# Patient Record
Sex: FEMALE | Race: WHITE | NOT HISPANIC OR LATINO
[De-identification: names, ages, dates, MRNs, and addresses within clinical notes are randomized per-mention and may not be internally consistent; named-entity substitution may affect disease eponyms.]

---

## 2018-07-20 ENCOUNTER — TRANSCRIPTION ENCOUNTER (OUTPATIENT)
Age: 58
End: 2018-07-20

## 2018-11-20 ENCOUNTER — OUTPATIENT (OUTPATIENT)
Dept: OUTPATIENT SERVICES | Facility: HOSPITAL | Age: 58
LOS: 1 days | End: 2018-11-20
Payer: MEDICAID

## 2018-11-20 PROCEDURE — 71046 X-RAY EXAM CHEST 2 VIEWS: CPT | Mod: 26

## 2020-05-15 ENCOUNTER — EMERGENCY (EMERGENCY)
Facility: HOSPITAL | Age: 60
LOS: 0 days | Discharge: HOME | End: 2020-05-15
Attending: STUDENT IN AN ORGANIZED HEALTH CARE EDUCATION/TRAINING PROGRAM
Payer: MEDICAID

## 2020-05-15 VITALS
RESPIRATION RATE: 16 BRPM | SYSTOLIC BLOOD PRESSURE: 115 MMHG | TEMPERATURE: 99 F | DIASTOLIC BLOOD PRESSURE: 83 MMHG | HEART RATE: 77 BPM | OXYGEN SATURATION: 100 %

## 2020-05-15 VITALS — HEIGHT: 65 IN | WEIGHT: 123.9 LBS
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
## 2020-05-15 DIAGNOSIS — M25.461 EFFUSION, RIGHT KNEE: ICD-10-CM

## 2020-05-15 DIAGNOSIS — M25.561 PAIN IN RIGHT KNEE: ICD-10-CM

## 2020-05-15 PROBLEM — Z00.00 ENCOUNTER FOR PREVENTIVE HEALTH EXAMINATION: Status: ACTIVE | Noted: 2020-05-15

## 2020-05-15 PROCEDURE — 73562 X-RAY EXAM OF KNEE 3: CPT | Mod: RT

## 2020-05-15 PROCEDURE — 73562 X-RAY EXAM OF KNEE 3: CPT | Mod: 26,RT

## 2020-05-15 PROCEDURE — 99283 EMERGENCY DEPT VISIT LOW MDM: CPT | Mod: 25

## 2020-05-15 PROCEDURE — 99283 EMERGENCY DEPT VISIT LOW MDM: CPT

## 2020-05-15 NOTE — ED PROVIDER NOTE - CLINICAL SUMMARY MEDICAL DECISION MAKING FREE TEXT BOX
60 y/o female c/o right knee swelling. Discussed need for orthopedics evaluation. Will check XR in ED and provide orthopedic referral.

## 2020-05-15 NOTE — ED PROVIDER NOTE - OBJECTIVE STATEMENT
60 y/o female with no significant PMHx presents to ED c/o right knee swelling s/p tripping and falling at the end of February. Pt has not been evaluated and has not had any imaging due to coronavirus pandemic closing outpatient centers. Pt has been walking for past 48 hours, however, walking has become more difficult. Pt denies re-injury since slip and fall 2 months ago. Pt notes limited ROM and denies numbness and tingling. Pt has not had surgery on her knee.

## 2020-05-15 NOTE — ED PROVIDER NOTE - CARE PROVIDER_API CALL
Emelyn De La Torre  [facility]  [address] [place] [address]  Phone: [phone]  Fax: [phone]  Follow Up Time:

## 2020-05-15 NOTE — ED PROVIDER NOTE - MUSCULOSKELETAL, MLM
Right suprapatellar knee effusion. No erythema, No obvious deformity. Neuro intact, 5/5 strength. Ambulatory with limp.

## 2020-05-15 NOTE — ED PROVIDER NOTE - ATTENDING CONTRIBUTION TO CARE
I, Cami Lester DO,  performed the initial face to face bedside interview with this patient regarding history of present illness, review of symptoms and relevant past medical, social and family history.  I completed an independent physical examination.  I was the initial provider who evaluated this patient. I have signed out the follow up of any pending tests (i.e. labs, radiological studies) to the ACP.  I have communicated the patient’s plan of care and disposition with the ACP.  The history, relevant review of systems, past medical and surgical history, medical decision making, and physical examination was documented by the scribe in my presence and I attest to the accuracy of the documentation.

## 2020-05-15 NOTE — ED PROVIDER NOTE - PATIENT PORTAL LINK FT
You can access the FollowMyHealth Patient Portal offered by [facility] by registering at the following website: [email or web address]. By joining [email or web address] [email or web address] portal, you will also be able to view your health information using other applications (apps) compatible with our system.

## 2020-05-20 ENCOUNTER — APPOINTMENT (OUTPATIENT)
Age: 60
End: 2020-05-20
Payer: MEDICAID

## 2020-05-20 VITALS
HEART RATE: 84 BPM | BODY MASS INDEX: 20.49 KG/M2 | WEIGHT: 120 LBS | HEIGHT: 64 IN | DIASTOLIC BLOOD PRESSURE: 74 MMHG | SYSTOLIC BLOOD PRESSURE: 124 MMHG | TEMPERATURE: 98.1 F

## 2020-05-20 PROCEDURE — 99204 OFFICE O/P NEW MOD 45 MIN: CPT | Mod: 25

## 2020-05-20 PROCEDURE — 20610 DRAIN/INJ JOINT/BURSA W/O US: CPT | Mod: RT

## 2020-05-20 NOTE — DISCUSSION/SUMMARY
[de-identified] : Today in the office I had a lengthy conversation with the patient regarding her right knee degenerative osteoarthritis status post her multiple falls as well as the chondrocalcinosis seen on x-ray. I am recommending a conservative spectrum of treatment including cortisone injection today as well as anti-inflammatories rest, ice and possible physical therapy. She will also utilize a home exercise program. Followup with me will be in 2-3 months as needed.All questions were answered and the patient verbalized understanding.  The patient is in agreement with this treatment plan.

## 2020-05-20 NOTE — HISTORY OF PRESENT ILLNESS
[de-identified] : The patient is a 59 year old female for pain to the right knee.  She had a fall on or about the last week of February/early March involving a rug.  She tripped over the rug.  She hd some swelling to the right knee and it improved. She was able to walk and felt a jolt while navigating uneven terrain in her apartment complex.  Last week she noticed increased swelling.  No new trauma.  No fevers/chills/NS.\par Denies redness.  Denies any relation to work injury or no fault.\par \par Modifying factors: ice

## 2020-05-20 NOTE — PHYSICAL EXAM
[de-identified] : Outside xrays from  5/15/20 show no fracture.  Degenerative changes.  Alignment maintained.  [de-identified] : General: Alert and oriented x3. In no acute distress. Pleasant in nature with a normal affect.  No apparent respiratory distress.\par \par Right knee exam\par \par Skin: Clean, dry, intact\par Inspection: No obvious malalignment, no masses, + swelling, + effusion\par Pulses: 2+ DP/PT pulses\par ROM: RIGHT 5-90 degrees of flexion. No pain with deep knee flexion. LEFT 120 degrees of flexion. + pain with deep knee flexion. \par Tenderness: + MJLT. + LJLT. No pain over the patella facets. No pain to the quadriceps mechanism. \par Stability: Stable to varus, valgus, lachman testing. Negative anterior/posterior drawer.\par Strength: 5/5 Q/H/TA/GS/EHL, no atrophy\par Neuro: In tact to light touch throughout, DTR's normal\par Additional tests: Negative McMurrays test, Negative patellar grind test. \par Mild effusion\par \par

## 2020-05-20 NOTE — PROCEDURE
[de-identified] : Laterality: Right Knee\par \par The risks and benefits of the injection were reviewed with the patient today in office and all their questions were answered.  The injection site was the lateral aspect of the knee.  Prior to giving the injection the injection site was prepped with Chloraprep and a sterile field was created.  Sterile technique was carried out throughout the procedure.  After verbal consent from the patient we went ahead and aspirated 15 ml of joint fluid from the knee and then injected the left knee today with 2 ml 40 mg Depo-Medrol, 4 ml 1% lidocaine and 4 ml of .5% Bupivacaine for a total of 10 ml with a 22 pauly 1.5" needle.  The medication was placed into the suprapatellar pouch without complication.  Post injection the patient reported no pain, had a normal gait and good motion of the knee.  The patient denied numbness and tingling going down their leg.  There were no complications during the procedure.

## 2020-06-20 ENCOUNTER — EMERGENCY (EMERGENCY)
Facility: HOSPITAL | Age: 60
LOS: 0 days | Discharge: HOME | End: 2020-06-20
Attending: EMERGENCY MEDICINE
Payer: MEDICAID

## 2020-06-20 VITALS
SYSTOLIC BLOOD PRESSURE: 131 MMHG | OXYGEN SATURATION: 100 % | DIASTOLIC BLOOD PRESSURE: 78 MMHG | RESPIRATION RATE: 19 BRPM | TEMPERATURE: 99 F | HEART RATE: 82 BPM

## 2020-06-20 VITALS — WEIGHT: 123.9 LBS

## 2020-06-20 DIAGNOSIS — R10.9 UNSPECIFIED ABDOMINAL PAIN: ICD-10-CM

## 2020-06-20 DIAGNOSIS — R05 COUGH: ICD-10-CM

## 2020-06-20 DIAGNOSIS — R11.10 VOMITING, UNSPECIFIED: ICD-10-CM

## 2020-06-20 DIAGNOSIS — K52.9 NONINFECTIVE GASTROENTERITIS AND COLITIS, UNSPECIFIED: ICD-10-CM

## 2020-06-20 DIAGNOSIS — R19.7 DIARRHEA, UNSPECIFIED: ICD-10-CM

## 2020-06-20 DIAGNOSIS — J30.2 OTHER SEASONAL ALLERGIC RHINITIS: ICD-10-CM

## 2020-06-20 LAB
ALBUMIN SERPL ELPH-MCNC: 4.2 G/DL — SIGNIFICANT CHANGE UP (ref 3.3–5)
ALP SERPL-CCNC: 63 U/L — SIGNIFICANT CHANGE UP (ref 40–120)
ALT FLD-CCNC: 26 U/L — SIGNIFICANT CHANGE UP (ref 12–78)
ANION GAP SERPL CALC-SCNC: 6 MMOL/L — SIGNIFICANT CHANGE UP (ref 5–17)
APPEARANCE UR: CLEAR — SIGNIFICANT CHANGE UP
AST SERPL-CCNC: 23 U/L — SIGNIFICANT CHANGE UP (ref 15–37)
BACTERIA # UR AUTO: ABNORMAL
BASOPHILS # BLD AUTO: 0.03 K/UL — SIGNIFICANT CHANGE UP (ref 0–0.2)
BASOPHILS NFR BLD AUTO: 0.3 % — SIGNIFICANT CHANGE UP (ref 0–2)
BILIRUB SERPL-MCNC: 0.5 MG/DL — SIGNIFICANT CHANGE UP (ref 0.2–1.2)
BILIRUB UR-MCNC: NEGATIVE — SIGNIFICANT CHANGE UP
BUN SERPL-MCNC: 6 MG/DL — LOW (ref 7–23)
CALCIUM SERPL-MCNC: 9.7 MG/DL — SIGNIFICANT CHANGE UP (ref 8.5–10.1)
CHLORIDE SERPL-SCNC: 100 MMOL/L — SIGNIFICANT CHANGE UP (ref 96–108)
CO2 SERPL-SCNC: 29 MMOL/L — SIGNIFICANT CHANGE UP (ref 22–31)
COLOR SPEC: YELLOW — SIGNIFICANT CHANGE UP
CREAT SERPL-MCNC: 0.64 MG/DL — SIGNIFICANT CHANGE UP (ref 0.5–1.3)
DIFF PNL FLD: NEGATIVE — SIGNIFICANT CHANGE UP
EOSINOPHIL # BLD AUTO: 0.02 K/UL — SIGNIFICANT CHANGE UP (ref 0–0.5)
EOSINOPHIL NFR BLD AUTO: 0.2 % — SIGNIFICANT CHANGE UP (ref 0–6)
EPI CELLS # UR: SIGNIFICANT CHANGE UP
GLUCOSE SERPL-MCNC: 104 MG/DL — HIGH (ref 70–99)
GLUCOSE UR QL: NEGATIVE MG/DL — SIGNIFICANT CHANGE UP
HCT VFR BLD CALC: 40.7 % — SIGNIFICANT CHANGE UP (ref 34.5–45)
HGB BLD-MCNC: 14 G/DL — SIGNIFICANT CHANGE UP (ref 11.5–15.5)
IMM GRANULOCYTES NFR BLD AUTO: 0.3 % — SIGNIFICANT CHANGE UP (ref 0–1.5)
KETONES UR-MCNC: NEGATIVE — SIGNIFICANT CHANGE UP
LEUKOCYTE ESTERASE UR-ACNC: ABNORMAL
LIDOCAIN IGE QN: 104 U/L — SIGNIFICANT CHANGE UP (ref 73–393)
LYMPHOCYTES # BLD AUTO: 1.64 K/UL — SIGNIFICANT CHANGE UP (ref 1–3.3)
LYMPHOCYTES # BLD AUTO: 14.2 % — SIGNIFICANT CHANGE UP (ref 13–44)
MCHC RBC-ENTMCNC: 32.8 PG — SIGNIFICANT CHANGE UP (ref 27–34)
MCHC RBC-ENTMCNC: 34.4 GM/DL — SIGNIFICANT CHANGE UP (ref 32–36)
MCV RBC AUTO: 95.3 FL — SIGNIFICANT CHANGE UP (ref 80–100)
MONOCYTES # BLD AUTO: 0.7 K/UL — SIGNIFICANT CHANGE UP (ref 0–0.9)
MONOCYTES NFR BLD AUTO: 6.1 % — SIGNIFICANT CHANGE UP (ref 2–14)
NEUTROPHILS # BLD AUTO: 9.11 K/UL — HIGH (ref 1.8–7.4)
NEUTROPHILS NFR BLD AUTO: 78.9 % — HIGH (ref 43–77)
NITRITE UR-MCNC: NEGATIVE — SIGNIFICANT CHANGE UP
PH UR: 8 — SIGNIFICANT CHANGE UP (ref 5–8)
PLATELET # BLD AUTO: 313 K/UL — SIGNIFICANT CHANGE UP (ref 150–400)
POTASSIUM SERPL-MCNC: 3.8 MMOL/L — SIGNIFICANT CHANGE UP (ref 3.5–5.3)
POTASSIUM SERPL-SCNC: 3.8 MMOL/L — SIGNIFICANT CHANGE UP (ref 3.5–5.3)
PROT SERPL-MCNC: 8.5 GM/DL — HIGH (ref 6–8.3)
PROT UR-MCNC: NEGATIVE MG/DL — SIGNIFICANT CHANGE UP
RBC # BLD: 4.27 M/UL — SIGNIFICANT CHANGE UP (ref 3.8–5.2)
RBC # FLD: 12.8 % — SIGNIFICANT CHANGE UP (ref 10.3–14.5)
RBC CASTS # UR COMP ASSIST: SIGNIFICANT CHANGE UP /HPF (ref 0–4)
SODIUM SERPL-SCNC: 135 MMOL/L — SIGNIFICANT CHANGE UP (ref 135–145)
SP GR SPEC: 1.01 — SIGNIFICANT CHANGE UP (ref 1.01–1.02)
UROBILINOGEN FLD QL: NEGATIVE MG/DL — SIGNIFICANT CHANGE UP
WBC # BLD: 11.53 K/UL — HIGH (ref 3.8–10.5)
WBC # FLD AUTO: 11.53 K/UL — HIGH (ref 3.8–10.5)
WBC UR QL: SIGNIFICANT CHANGE UP

## 2020-06-20 PROCEDURE — 81001 URINALYSIS AUTO W/SCOPE: CPT

## 2020-06-20 PROCEDURE — 85025 COMPLETE CBC W/AUTO DIFF WBC: CPT

## 2020-06-20 PROCEDURE — 83690 ASSAY OF LIPASE: CPT

## 2020-06-20 PROCEDURE — 93005 ELECTROCARDIOGRAM TRACING: CPT

## 2020-06-20 PROCEDURE — 36415 COLL VENOUS BLD VENIPUNCTURE: CPT

## 2020-06-20 PROCEDURE — 99284 EMERGENCY DEPT VISIT MOD MDM: CPT | Mod: 25

## 2020-06-20 PROCEDURE — 96374 THER/PROPH/DIAG INJ IV PUSH: CPT

## 2020-06-20 PROCEDURE — 99284 EMERGENCY DEPT VISIT MOD MDM: CPT

## 2020-06-20 PROCEDURE — 80053 COMPREHEN METABOLIC PANEL: CPT

## 2020-06-20 PROCEDURE — 96361 HYDRATE IV INFUSION ADD-ON: CPT

## 2020-06-20 PROCEDURE — 93010 ELECTROCARDIOGRAM REPORT: CPT

## 2020-06-20 RX ORDER — ONDANSETRON 8 MG/1
4 TABLET, FILM COATED ORAL ONCE
Refills: 0 | Status: COMPLETED | OUTPATIENT
Start: 2020-06-20 | End: 2020-06-20

## 2020-06-20 RX ORDER — SODIUM CHLORIDE 9 MG/ML
1000 INJECTION INTRAMUSCULAR; INTRAVENOUS; SUBCUTANEOUS ONCE
Refills: 0 | Status: COMPLETED | OUTPATIENT
Start: 2020-06-20 | End: 2020-06-20

## 2020-06-20 RX ADMIN — SODIUM CHLORIDE 1000 MILLILITER(S): 9 INJECTION INTRAMUSCULAR; INTRAVENOUS; SUBCUTANEOUS at 11:25

## 2020-06-20 RX ADMIN — ONDANSETRON 4 MILLIGRAM(S): 8 TABLET, FILM COATED ORAL at 10:36

## 2020-06-20 RX ADMIN — SODIUM CHLORIDE 1000 MILLILITER(S): 9 INJECTION INTRAMUSCULAR; INTRAVENOUS; SUBCUTANEOUS at 10:36

## 2020-06-20 NOTE — ED STATDOCS - PATIENT PORTAL LINK FT
You can access the FollowMyHealth Patient Portal offered by [facility] by registering at the following website: [email or web address]. By joining [employer]’s FollowMyHealth portal, you will also be able to view your health information using other applications (apps) compatible with our system.

## 2020-06-20 NOTE — ED STATDOCS - PROGRESS NOTE DETAILS
PA note: Patient is a 58 y/o female with PMHx of seasonal allergies with chronic cough who presents to [facility] c/o dull L sided abdominal pain after eating chinese food last night. This morning had diarrhea and minimal vomiting. States abd pain improved but still some discomfort. Denies fever, CP, shortness of breath. Had a recent negative COVID test. ~Raman Ndiaye PA-C   Patient seen and evaluated at bedside. PE essentially normal. Will get labs, PO challenge. Reassess. ~Raman Ndiaye PA-C PA note: All labwork reviewed in details with patient. Patient re-examined and re-evaluated. Patient feels much better at this time. ED evaluation, Diagnosis and management discussed with the patient in detail. Workup results discussed with ED attending, OK to IN home. Close PMD follow up encouraged.  Strict ED return instructions discussed in detail and patient given the opportunity to ask any questions about their discharge diagnosis and instructions. Patient verbalized understanding. ~ Raman Ndiaye PA-C

## 2020-06-20 NOTE — ED STATDOCS - OBJECTIVE STATEMENT
60 y/o female with hx of seasonal allergies with chronic cough, presents to the ED c/o dull L sided abdominal pain after eating chinese food last night. This morning had diarrhea and minimal vomiting. States abd pain improved but still some discomfort. Denies fever, CP, shortness of breath. Had a recent negative COVID test.

## 2020-06-20 NOTE — ED STATDOCS - PHYSICAL EXAMINATION
PA NOTE: GEN: AOX3, NAD. HEENT: Throat clear. Airway is patent. EYES: PERRLA. EOMI. Head: NC/AT. NECK: Supple, No JVD. FROM. C-spine non-tender. CV:S1S2, RRR, LUNGS: Non-labored breathing, no tachypnea. O2sat 100% RA. CTA b/l. No w/r/r. CHEST: Equal chest expansion and rise. No deformity. ABD: Soft, NT/ND, no rebound, no guarding. No CVAT. EXT: No e/c/c. 2+ distal pulses. SKIN: No rashes. NEURO: No focal deficits. CN II-XII intact. FROM. 5/5 motor and sensory. ~Raman Ndiaye PA-C

## 2020-06-20 NOTE — ED STATDOCS - CLINICAL SUMMARY MEDICAL DECISION MAKING FREE TEXT BOX
pt without tenderness currently not suspicion for acute abd, will check basic labs, provide fluids, and reassess. If pt without pain, will likely DC with outpatient f/u. pt without tenderness currently not suspicion for acute abd, will check basic labs, provide fluids, and reassess. If pt without pain, will likely DC with outpatient f/u.    PA note: All labwork reviewed in details with patient. Patient re-examined and re-evaluated. Patient feels much better at this time. ED evaluation, Diagnosis and management discussed with the patient in detail. Workup results discussed with ED attending, OK to dc home. Close PMD follow up encouraged.  Strict ED return instructions discussed in detail and patient given the opportunity to ask any questions about their discharge diagnosis and instructions. Patient verbalized understanding. ~ Raman Ndiaye PA-C pt without tenderness currently not suspicious for acute abd, will check basic labs, provide fluids, and reassess. If pt without pain, will likely DC with outpatient f/u.    PA note: All labwork reviewed in details with patient. Patient re-examined and re-evaluated. Patient feels much better at this time. ED evaluation, Diagnosis and management discussed with the patient in detail. Workup results discussed with ED attending, OK to dc home. Close PMD follow up encouraged.  Strict ED return instructions discussed in detail and patient given the opportunity to ask any questions about their discharge diagnosis and instructions. Patient verbalized understanding. ~ Raman Ndiaye PA-C

## 2020-06-20 NOTE — ED ADULT NURSE NOTE - OBJECTIVE STATEMENT
Pt c/o luq pain started last night after eating chinese  food. pt c/o diarrhea no vomiting. h/o appendectomy.

## 2020-07-22 ENCOUNTER — APPOINTMENT (OUTPATIENT)
Dept: ORTHOPEDIC SURGERY | Facility: CLINIC | Age: 60
End: 2020-07-22
Payer: MEDICAID

## 2020-07-22 DIAGNOSIS — M11.261 OTHER CHONDROCALCINOSIS, RIGHT KNEE: ICD-10-CM

## 2020-07-22 DIAGNOSIS — M17.11 UNILATERAL PRIMARY OSTEOARTHRITIS, RIGHT KNEE: ICD-10-CM

## 2020-07-22 DIAGNOSIS — Z80.8 FAMILY HISTORY OF MALIGNANT NEOPLASM OF OTHER ORGANS OR SYSTEMS: ICD-10-CM

## 2020-07-22 PROCEDURE — 99213 OFFICE O/P EST LOW 20 MIN: CPT

## 2020-07-22 NOTE — DISCUSSION/SUMMARY
[de-identified] : Today I had a lengthy discussion with the patient regarding their right knee pain. I have addressed all the patient's concerns surrounding the pathology of their condition. At this point I advised the patient that she is able to return to work. I would like to see the patient back in the office PRN to reassess their condition. The patient understood and verbally agreed to the treatment plan. All of their questions were answered and they were satisfied with the visit. The patient should call the office if they have any questions or experience worsening symptoms.

## 2020-07-22 NOTE — PHYSICAL EXAM
[de-identified] : General: Alert and oriented x3. In no acute distress. Pleasant in nature with a normal affect.  No apparent respiratory distress.\par \par Right knee exam\par \par Skin: Clean, dry, intact\par Inspection: No obvious malalignment, no masses, + swelling, + effusion\par Pulses: 2+ DP/PT pulses\par ROM: RIGHT 5-90 degrees of flexion. No pain with deep knee flexion. LEFT 120 degrees of flexion. + pain with deep knee flexion. \par Tenderness: + MJLT. + LJLT. No pain over the patella facets. No pain to the quadriceps mechanism. \par Stability: Stable to varus, valgus, lachman testing. Negative anterior/posterior drawer.\par Strength: 5/5 Q/H/TA/GS/EHL, no atrophy\par Neuro: In tact to light touch throughout, DTR's normal\par Additional tests: Negative McMurrays test, Negative patellar grind test. \par Mild effusion [de-identified] : None new obtained.

## 2020-07-22 NOTE — ADDENDUM
[FreeTextEntry1] : I, Jordin Pop, acted solely as a scribe for Dr. Maico Bliss on this date 07/22/2020 .\par All medical record entries made by the Scribe were at my, Dr. Maico Bliss, direction and personally dictated by me on 07/22/2020 . I have reviewed the chart and agree that the record accurately reflects my personal performance of the history, physical exam, assessment and plan. I have also personally directed, reviewed, and agreed with the chart.

## 2020-07-22 NOTE — HISTORY OF PRESENT ILLNESS
[de-identified] : 60 year old female presenting with right knee pain. The patient’s pain is noted to be a 1/10. The pain and swelling are noted to be 100% improved compared to the previous visit. She reports having occasional pain in the front of the knee. She is currently taking no pain medication. No other complaints at this time.

## 2022-04-15 NOTE — ED STATDOCS - ATTENDING CONTRIBUTION TO CARE
normal...
I provided initial assessment of patient and had discussion of plan with ACP and was available for further consultation throughout ed stay.

## 2023-04-04 ENCOUNTER — TRANSCRIPTION ENCOUNTER (OUTPATIENT)
Age: 63
End: 2023-04-04

## 2023-04-04 NOTE — ASU PATIENT PROFILE, ADULT - NSICDXPASTMEDICALHX_GEN_ALL_CORE_FT
PAST MEDICAL HISTORY:  No pertinent past medical history      PAST MEDICAL HISTORY:  Chronic cough dry cough     PAST MEDICAL HISTORY:  Chronic cough dry cough due to allergies

## 2023-04-04 NOTE — ASU PATIENT PROFILE, ADULT - NSICDXPASTSURGICALHX_GEN_ALL_CORE_FT
PAST SURGICAL HISTORY:  H/O breast augmentation     S/P appendectomy     S/P hernia repair right    S/P left knee surgery

## 2023-04-04 NOTE — ASU PATIENT PROFILE, ADULT - NS PREOP UNDERSTANDS INFO
yes No food from midnight, little water till 04:30 AM DOS. Bring photo id, insurance card. Wear loose comfort clothes . No alcohol/ drugs / smoking. Must have an escort . No jewelry, no valuables./yes Time Change message left.  No food from midnight, little water till 0700 AM DOS. Bring photo id, insurance card. Wear loose comfort clothes . No alcohol/ drugs / smoking. Must have an escort . No jewelry, no valuables./yes

## 2023-04-05 ENCOUNTER — TRANSCRIPTION ENCOUNTER (OUTPATIENT)
Age: 63
End: 2023-04-05

## 2023-04-05 ENCOUNTER — OUTPATIENT (OUTPATIENT)
Dept: OUTPATIENT SERVICES | Facility: HOSPITAL | Age: 63
LOS: 1 days | Discharge: HOME | End: 2023-04-05
Payer: MEDICAID

## 2023-04-05 VITALS
DIASTOLIC BLOOD PRESSURE: 73 MMHG | WEIGHT: 120.37 LBS | OXYGEN SATURATION: 98 % | TEMPERATURE: 99 F | HEIGHT: 64 IN | RESPIRATION RATE: 16 BRPM | SYSTOLIC BLOOD PRESSURE: 124 MMHG | HEART RATE: 85 BPM

## 2023-04-05 VITALS
HEART RATE: 70 BPM | DIASTOLIC BLOOD PRESSURE: 60 MMHG | SYSTOLIC BLOOD PRESSURE: 118 MMHG | RESPIRATION RATE: 18 BRPM | OXYGEN SATURATION: 98 %

## 2023-04-05 DIAGNOSIS — Z90.49 ACQUIRED ABSENCE OF OTHER SPECIFIED PARTS OF DIGESTIVE TRACT: Chronic | ICD-10-CM

## 2023-04-05 DIAGNOSIS — Z98.890 OTHER SPECIFIED POSTPROCEDURAL STATES: Chronic | ICD-10-CM

## 2023-04-05 DIAGNOSIS — Z98.82 BREAST IMPLANT STATUS: Chronic | ICD-10-CM

## 2023-04-05 PROCEDURE — 88302 TISSUE EXAM BY PATHOLOGIST: CPT | Mod: 26

## 2023-04-05 PROCEDURE — 88305 TISSUE EXAM BY PATHOLOGIST: CPT | Mod: 26

## 2023-04-05 PROCEDURE — 88304 TISSUE EXAM BY PATHOLOGIST: CPT | Mod: 26

## 2023-04-05 PROCEDURE — 88300 SURGICAL PATH GROSS: CPT | Mod: 26,59

## 2023-04-05 PROCEDURE — 88305 TISSUE EXAM BY PATHOLOGIST: CPT | Mod: 26,59

## 2023-04-05 PROCEDURE — 88173 CYTOPATH EVAL FNA REPORT: CPT | Mod: 26

## 2023-04-05 DEVICE — SURGICEL POWDER 3 GRAMS: Type: IMPLANTABLE DEVICE | Site: BILATERAL | Status: FUNCTIONAL

## 2023-04-05 RX ORDER — CEPHALEXIN 500 MG
1 CAPSULE ORAL
Qty: 20 | Refills: 0
Start: 2023-04-05 | End: 2023-04-09

## 2023-04-05 RX ORDER — OXYCODONE AND ACETAMINOPHEN 5; 325 MG/1; MG/1
1 TABLET ORAL
Qty: 20 | Refills: 0
Start: 2023-04-05

## 2023-04-05 RX ORDER — FENTANYL CITRATE 50 UG/ML
25 INJECTION INTRAVENOUS
Refills: 0 | Status: DISCONTINUED | OUTPATIENT
Start: 2023-04-05 | End: 2023-04-05

## 2023-04-05 RX ORDER — ACETAMINOPHEN 500 MG
1000 TABLET ORAL ONCE
Refills: 0 | Status: COMPLETED | OUTPATIENT
Start: 2023-04-05 | End: 2023-04-05

## 2023-04-05 RX ORDER — APREPITANT 80 MG/1
40 CAPSULE ORAL ONCE
Refills: 0 | Status: COMPLETED | OUTPATIENT
Start: 2023-04-05 | End: 2023-04-05

## 2023-04-05 RX ORDER — SODIUM CHLORIDE 9 MG/ML
1000 INJECTION, SOLUTION INTRAVENOUS
Refills: 0 | Status: DISCONTINUED | OUTPATIENT
Start: 2023-04-05 | End: 2023-04-05

## 2023-04-05 RX ADMIN — APREPITANT 40 MILLIGRAM(S): 80 CAPSULE ORAL at 09:56

## 2023-04-05 RX ADMIN — FENTANYL CITRATE 25 MICROGRAM(S): 50 INJECTION INTRAVENOUS at 14:43

## 2023-04-05 RX ADMIN — FENTANYL CITRATE 25 MICROGRAM(S): 50 INJECTION INTRAVENOUS at 14:20

## 2023-04-05 RX ADMIN — FENTANYL CITRATE 25 MICROGRAM(S): 50 INJECTION INTRAVENOUS at 15:30

## 2023-04-05 RX ADMIN — FENTANYL CITRATE 25 MICROGRAM(S): 50 INJECTION INTRAVENOUS at 14:42

## 2023-04-05 RX ADMIN — Medication 1000 MILLIGRAM(S): at 09:56

## 2023-04-05 RX ADMIN — FENTANYL CITRATE 25 MICROGRAM(S): 50 INJECTION INTRAVENOUS at 14:38

## 2023-04-05 RX ADMIN — FENTANYL CITRATE 25 MICROGRAM(S): 50 INJECTION INTRAVENOUS at 14:08

## 2023-04-05 NOTE — ASU PREOP CHECKLIST - 1.
as per patient no signs and symptoms from covid and no positive test from covid within the past 10 days

## 2023-04-05 NOTE — ASU PREOP CHECKLIST - 2.
Patient has asymmetrical  breasts, right breast bigger than left, non tender to touch, no redness, no discharges  Per patient started graduallly getting bigger since her fall of 2021.

## 2023-04-05 NOTE — ASU PREOP CHECKLIST - ALLERGY BAND ON
SPOKE TO GONZÁLEZ  TO MAKE SURE PT  IS CLEARED AND STATED DR Andre Stark DOES NOT LIKE  HER  WORDING AND WILL NEED TO CORRECT AND WILL LET ME KNOW WHEN THIS  IS READY done

## 2023-04-05 NOTE — ASU DISCHARGE PLAN (ADULT/PEDIATRIC) - CARE PROVIDER_API CALL
Adonis Blancas)  Plastic Surgery  [address] [place] [id number]  Phone: [phone]  Fax: [phone]  Follow Up Time:

## 2023-04-05 NOTE — ASU DISCHARGE PLAN (ADULT/PEDIATRIC) - ASU DC SPECIAL INSTRUCTIONSFT
Please follow-up with Dr. Blancas in next week. Call the office to schedule an appointment.  Please take Tylenol as needed for pain every 6 hours. You may take Percocet for severe breakthrough pain (4 tablets max a day)  Keep bra on until you follow-up in the office. Sponge bath only.  Take keflex 4 tabs a day for 5 days.    KIMBERLY Drain Care:  *Please look at the site every day for signs of infection (increased redness or pain, swelling, odor, yellow or bloody discharge, warm to touch, fever).  *Maintain suction of the bulb.  *Note color, consistency, and amount of fluid in the drain. Call the doctor, nurse practitioner, or VNA nurse if the amount increases significantly or changes in character.  *Be sure to empty the drain frequently. Record the output, if instructed to do so.  *Keep the insertion site clean and dry otherwise.  *Avoid swimming, baths, hot tubs; do not submerge yourself in water.  *Make sure to keep the drain attached securely to your body to prevent pulling or dislocation.

## 2023-04-05 NOTE — ASU DISCHARGE PLAN (ADULT/PEDIATRIC) - NS MD DC FALL RISK RISK
For information on Fall & Injury Prevention, visit: [email or web address] OR  [email or web address] OR  https://www.cdc.gov/steadi/patient.html

## 2023-04-05 NOTE — ASU PREOP CHECKLIST - LOOSE TEETH
tissue    TOTAL KNEE ARTHROPLASTY Right 9/2015       Family History: Denies family history of colorectal cancer    Social History:   Social History   Substance Use Topics    Smoking status: Former Smoker     Packs/day: 0.50     Years: 15.00     Types: Cigarettes    Smokeless tobacco: Never Used    Alcohol use Yes      Comment: rare      Tobacco cessation counseling provided as appropriate. REVIEW OF SYSTEMS:    Pertinent positives and negatives are mentioned in the HPI above. Otherwise, all other systems were reviewed and negative. Objective:     Physical Exam   /86 (Site: Left Arm, Position: Sitting, Cuff Size: Large Adult)   Ht 5' 9\" (1.753 m)   Wt 270 lb 12.8 oz (122.8 kg)   BMI 39.99 kg/m²   Constitutional: Appears well-developed and well-nourished. Grooming appropriate. No gross deformities. Body mass index is 39.99 kg/m². Eyes: No scleral icterus. Conjunctiva/lids normal. Vision intact grossly. Pupils equal/symmetric, reactive bilaterally. ENT: External ears/nose without defect, scars, or masses. Hearing grossly intact. No facial deformity. Lips normal, normal dentition. Neck: No masses. Trachea midline. No crepitus. Thyroid not enlarged. Cardiovascular: Normal rate. No peripheral edema. Abdominal aorta normal size to palpation. Pulmonary/Chest: Effort normal. No respiratory distress. No wheezes. No use of accessory muscles. Musculoskeletal: Normal range of motion x all 4 extremities and head/neck, without deformity, pain, or crepitus, with normal strength and tone. Normal gait. Nails without clubbing or cyanosis. Neurological: Alert and oriented to person, place, and time. No gross deficits. Sensation intact. Skin: Skin is dry. No rashes noted. No pallor. No induration of nodules. Psychiatric: Normal mood and affect. Behavior normal. Oriented to person, place, and time. Judgment and insight reasonable.     Abdominal/wound: soft, obese, previously healed scars, nontender, satisfaction. Patient understands higher risk of perioperative morbidity and mortality given: morbid obesity, previous surgery, anti TNF alpha inhibitor medication    DISPOSITION:  MRe/CTe followed by surgery    My findings will be relayed to consulting practitioner or PCP via Epic. Also discussed case with her gastroenterologist via telephone. Total encounter time:  60 min with > 50% spent with face-to-face counseling and coordination of care, including: thorough discussion regarding pathophysiology and natural history of Crohn's small bowel disease, discussion of medical and surgical options, including risks of proposed surgery, perioperative expectations. Note completed using dictation software, please excuse any errors.     Electronically signed by Keira Poe MD on 10/11/2017 at 3:46 PM no

## 2023-04-10 LAB — NON-GYNECOLOGICAL CYTOLOGY STUDY: SIGNIFICANT CHANGE UP

## 2023-04-13 LAB — SURGICAL PATHOLOGY STUDY: SIGNIFICANT CHANGE UP

## 2023-04-19 NOTE — ED PROVIDER NOTE - CPE EDP RESP NORM
Pharmacy Vancomycin Initial Note  Date of Service 2023  Patient's  1955  68 year old, male    Indication: Sepsis and Urinary Tract Infection    Current estimated CrCl = Estimated Creatinine Clearance: 130.8 mL/min (A) (based on SCr of 0.62 mg/dL (L)).    Creatinine for last 3 days  2023:  2:40 AM Creatinine 0.62 mg/dL    Recent Vancomycin Level(s) for last 3 days  No results found for requested labs within last 3 days.      Vancomycin IV Administrations (past 72 hours)      No vancomycin orders with administrations in past 72 hours.                Nephrotoxins and other renal medications (From now, onward)    Start     Dose/Rate Route Frequency Ordered Stop    23  vancomycin (VANCOCIN) 1,500 mg in sodium chloride 0.9 % 500 mL intermittent infusion         1,500 mg  over 90 Minutes Intravenous EVERY 12 HOURS 23            Contrast Orders - past 72 hours (72h ago, onward)    None          [employer] Prediction of Planned Initial Vancomycin Regimen  Loading dose: N/A  Regimen: 1500 mg IV every 12 hours.  Start time: 04:23 on 2023  Exposure target: AUC24 (range)400-600 mg/L.hr   AUC24,ss: 547 mg/L.hr  Probability of AUC24 > 400: 81 %  Ctrough,ss: 16.7 mg/L  Probability of Ctrough,ss > 20: 36 %  Probability of nephrotoxicity (Lodise HERON ): 12 %          Plan:  1. Start vancomycin  1500 mg IV q12h.   2. Vancomycin monitoring method: AUC  3. Vancomycin therapeutic monitoring goal: 400-600 mg*h/L  4. Pharmacy will check vancomycin levels as appropriate in 1-3 Days.    5. Serum creatinine levels will be ordered daily for the first week of therapy and at least twice weekly for subsequent weeks.      Ezequiel Barnes, PharmD     normal...

## 2025-05-15 ENCOUNTER — EMERGENCY (EMERGENCY)
Facility: HOSPITAL | Age: 65
LOS: 0 days | Discharge: OTHER INSTITUTION | End: 2025-05-15
Attending: STUDENT IN AN ORGANIZED HEALTH CARE EDUCATION/TRAINING PROGRAM
Payer: MEDICARE

## 2025-05-15 ENCOUNTER — EMERGENCY (EMERGENCY)
Facility: HOSPITAL | Age: 65
LOS: 1 days | End: 2025-05-15
Attending: EMERGENCY MEDICINE | Admitting: EMERGENCY MEDICINE
Payer: MEDICARE

## 2025-05-15 VITALS
SYSTOLIC BLOOD PRESSURE: 141 MMHG | TEMPERATURE: 98 F | HEART RATE: 85 BPM | OXYGEN SATURATION: 96 % | DIASTOLIC BLOOD PRESSURE: 72 MMHG | RESPIRATION RATE: 16 BRPM

## 2025-05-15 VITALS
TEMPERATURE: 98 F | OXYGEN SATURATION: 100 % | WEIGHT: 117.73 LBS | HEART RATE: 73 BPM | DIASTOLIC BLOOD PRESSURE: 72 MMHG | RESPIRATION RATE: 18 BRPM | SYSTOLIC BLOOD PRESSURE: 145 MMHG

## 2025-05-15 VITALS
OXYGEN SATURATION: 96 % | RESPIRATION RATE: 17 BRPM | HEART RATE: 88 BPM | HEIGHT: 68 IN | SYSTOLIC BLOOD PRESSURE: 123 MMHG | TEMPERATURE: 98 F | WEIGHT: 128.09 LBS | DIASTOLIC BLOOD PRESSURE: 78 MMHG

## 2025-05-15 VITALS
HEART RATE: 85 BPM | TEMPERATURE: 98 F | DIASTOLIC BLOOD PRESSURE: 73 MMHG | OXYGEN SATURATION: 100 % | RESPIRATION RATE: 18 BRPM | SYSTOLIC BLOOD PRESSURE: 118 MMHG

## 2025-05-15 DIAGNOSIS — R68.84 JAW PAIN: ICD-10-CM

## 2025-05-15 DIAGNOSIS — Z91.018 ALLERGY TO OTHER FOODS: ICD-10-CM

## 2025-05-15 DIAGNOSIS — S01.81XA LACERATION WITHOUT FOREIGN BODY OF OTHER PART OF HEAD, INITIAL ENCOUNTER: ICD-10-CM

## 2025-05-15 DIAGNOSIS — Z98.82 BREAST IMPLANT STATUS: Chronic | ICD-10-CM

## 2025-05-15 DIAGNOSIS — Z90.49 ACQUIRED ABSENCE OF OTHER SPECIFIED PARTS OF DIGESTIVE TRACT: Chronic | ICD-10-CM

## 2025-05-15 DIAGNOSIS — Z98.890 OTHER SPECIFIED POSTPROCEDURAL STATES: Chronic | ICD-10-CM
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
## 2025-05-15 DIAGNOSIS — S02.609A FRACTURE OF MANDIBLE, UNSPECIFIED, INITIAL ENCOUNTER FOR CLOSED FRACTURE: ICD-10-CM
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
## 2025-05-15 PROCEDURE — 99285 EMERGENCY DEPT VISIT HI MDM: CPT

## 2025-05-15 PROCEDURE — 70486 CT MAXILLOFACIAL W/O DYE: CPT

## 2025-05-15 PROCEDURE — 76376 3D RENDER W/INTRP POSTPROCES: CPT

## 2025-05-15 PROCEDURE — 70450 CT HEAD/BRAIN W/O DYE: CPT

## 2025-05-15 PROCEDURE — 70486 CT MAXILLOFACIAL W/O DYE: CPT | Mod: 26

## 2025-05-15 PROCEDURE — 72125 CT NECK SPINE W/O DYE: CPT

## 2025-05-15 PROCEDURE — 99285 EMERGENCY DEPT VISIT HI MDM: CPT | Mod: FS,25

## 2025-05-15 PROCEDURE — 99284 EMERGENCY DEPT VISIT MOD MDM: CPT | Mod: 25

## 2025-05-15 PROCEDURE — 76376 3D RENDER W/INTRP POSTPROCES: CPT | Mod: 26

## 2025-05-15 PROCEDURE — 70450 CT HEAD/BRAIN W/O DYE: CPT | Mod: 26

## 2025-05-15 PROCEDURE — 72125 CT NECK SPINE W/O DYE: CPT | Mod: 26

## 2025-05-15 PROCEDURE — 12011 RPR F/E/E/N/L/M 2.5 CM/<: CPT

## 2025-05-15 RX ORDER — CEFAZOLIN SODIUM IN 0.9 % NACL 3 G/100 ML
2000 INTRAVENOUS SOLUTION, PIGGYBACK (ML) INTRAVENOUS ONCE
Refills: 0 | Status: DISCONTINUED | OUTPATIENT
Start: 2025-05-15 | End: 2025-05-15

## 2025-05-15 RX ORDER — KETOROLAC TROMETHAMINE 30 MG/ML
15 INJECTION, SOLUTION INTRAMUSCULAR; INTRAVENOUS ONCE
Refills: 0 | Status: COMPLETED | OUTPATIENT
Start: 2025-05-15 | End: 2025-05-15

## 2025-05-15 RX ORDER — CLOSTRIDIUM TETANI TOXOID ANTIGEN (FORMALDEHYDE INACTIVATED), CORYNEBACTERIUM DIPHTHERIAE TOXOID ANTIGEN (FORMALDEHYDE INACTIVATED), BORDETELLA PERTUSSIS TOXOID ANTIGEN (GLUTARALDEHYDE INACTIVATED), BORDETELLA PERTUSSIS FILAMENTOUS HEMAGGLUTININ ANTIGEN (FORMALDEHYDE INACTIVATED), BORDETELLA PERTUSSIS PERTACTIN ANTIGEN, AND BORDETELLA PERTUSSIS FIMBRIAE 2/3 ANTIGEN 5; 2; 2.5; 5; 3; 5 [LF]/.5ML; [LF]/.5ML; UG/.5ML; UG/.5ML; UG/.5ML; UG/.5ML
0.5 INJECTION, SUSPENSION INTRAMUSCULAR ONCE
Refills: 0 | Status: DISCONTINUED | OUTPATIENT
Start: 2025-05-15 | End: 2025-05-15

## 2025-05-15 RX ORDER — CLOSTRIDIUM TETANI TOXOID ANTIGEN (FORMALDEHYDE INACTIVATED), CORYNEBACTERIUM DIPHTHERIAE TOXOID ANTIGEN (FORMALDEHYDE INACTIVATED), BORDETELLA PERTUSSIS TOXOID ANTIGEN (GLUTARALDEHYDE INACTIVATED), BORDETELLA PERTUSSIS FILAMENTOUS HEMAGGLUTININ ANTIGEN (FORMALDEHYDE INACTIVATED), BORDETELLA PERTUSSIS PERTACTIN ANTIGEN, AND BORDETELLA PERTUSSIS FIMBRIAE 2/3 ANTIGEN 5; 2; 2.5; 5; 3; 5 [LF]/.5ML; [LF]/.5ML; UG/.5ML; UG/.5ML; UG/.5ML; UG/.5ML
0.5 INJECTION, SUSPENSION INTRAMUSCULAR ONCE
Refills: 0 | Status: COMPLETED | OUTPATIENT
Start: 2025-05-15 | End: 2025-05-15

## 2025-05-15 RX ADMIN — CLOSTRIDIUM TETANI TOXOID ANTIGEN (FORMALDEHYDE INACTIVATED), CORYNEBACTERIUM DIPHTHERIAE TOXOID ANTIGEN (FORMALDEHYDE INACTIVATED), BORDETELLA PERTUSSIS TOXOID ANTIGEN (GLUTARALDEHYDE INACTIVATED), BORDETELLA PERTUSSIS FILAMENTOUS HEMAGGLUTININ ANTIGEN (FORMALDEHYDE INACTIVATED), BORDETELLA PERTUSSIS PERTACTIN ANTIGEN, AND BORDETELLA PERTUSSIS FIMBRIAE 2/3 ANTIGEN 0.5 MILLILITER(S): 5; 2; 2.5; 5; 3; 5 INJECTION, SUSPENSION INTRAMUSCULAR at 22:59

## 2025-05-15 NOTE — CONSULT NOTE ADULT - SUBJECTIVE AND OBJECTIVE BOX
OMFS Consult Note:    STARLA LONG  MRN-[id number]  [facility] ED.    Patient is a 64y old  Female who presents with a chief complaint of mandible fracture       HPI:  64-year-old female denies significant past medical history was transferred for evaluation by OMFS for bilateral mandibular condylar fracture, right side is medially displaced.  Patient states she was walking across the street brushing and fell forward hitting her chin on the car.  No LOC.  Not dizzy not lightheaded before or after the fall.  Denies headache denies neck pain denies chest pain denies abdominal pain.  Not on blood thinners.  Prior to transfer the repair the chin lack.  Has no loose teeth.      PAST MEDICAL & SURGICAL HISTORY:  Chronic cough  dry cough due to allergies      H/O breast augmentation      S/P left knee surgery      S/P hernia repair  right      S/P appendectomy            Home Medications:        Allergies    food creams (Other)  No Known Drug Allergies  fried foods (itchiness and coughing) (Other)  Cheese (Pruritus)    Intolerances            *SOCIAL HISTORY: Denies ETOH use, Tobacco, drugs    Review of systems:  denies fever, chills. denies LOC. denies nausea/vomiting/headache. denies CP, SOB, cough. Denies palpatitions. denies blurred/double vision. denies dyspahgia, dyspnea.      T(F): 98.2 (05-15-25 @ 23:00), Max: 98.4 (05-15-25 @ 18:42)  HR: 85 (05-15-25 @ 23:00) (73 - 88)  BP: 141/72 (05-15-25 @ 23:00) (118/73 - 145/72)  RR: 16 (05-15-25 @ 23:00) (16 - 18)  SpO2: 96% (05-15-25 @ 23:00) (96% - 100%)      MEDICATIONS  (STANDING):    MEDICATIONS  (PRN):      PHYSICAL EXAM:    Gen: AAox3, NAD, NC/AT  Head: no Lacerations/abrasions/hematomas. no edema/erythema/tenderness to palpation. no asymmetry. no signs of scalp infection  Eyes: EOMI, PERRL, visual acuity intact, no diplopia,  supra/infra orbital rims intact, no subconjunctival heme, no telecanthus, no exophthalmos  Ears: Gross hearing intact, No heme appreciated, Condylar head palpated  Nose: no crepitis/septal hematoma/asymmetry.  no Lacerations/abrasions/hematomas.  Malar: no malar depression, no CN V-2 paresthesia  Throat: no LAD, supple, FROM, no lesions    Extraoral/Intraoral Exam: R periauricular edema, mild TTP LIBBY:~30mm, no pain on opening/closing. stable and reproducible occlusion, poor dentition with multiple missing and broken teeth, no mobile teeth,  chin lac repaired, lower lip lac repaired, no mandibular step deformity, no CN V-3 paresthesia, no signs of infection. FOM soft, NT. no mobility of maxilla/crepitis. TMJ: noclicking/popping  CN II-XII intact    LABS:    IMAGING:    CT HEAD:  No acute intracranial hemorrhage, mass effect, or midline shift.    CT MAXILLOFACIAL:  Bilateral mandibular condylar fractures which are acute the right of which is displaced medially    CT CERVICAL SPINE:  No acute fracture or traumatic subluxation.    Multi-level degenerative changes.

## 2025-05-15 NOTE — ED PROVIDER NOTE - PATIENT PORTAL LINK FT
You can access the FollowMyHealth Patient Portal offered by [facility] by registering at the following website: [email or web address]. By joining [facility]’s FollowMyHealth portal, you will also be able to view your health information using other applications (apps) compatible with our system.

## 2025-05-15 NOTE — ED STATDOCS - ATTENDING APP SHARED VISIT CONTRIBUTION OF CARE
I Xiomara Dunham DO have personally seen and examined this patient.  I have fully participated in the care of this patient.  The initial assessment was performed by myself and then the patient was handed off to the ACP. The patient was followed and re-evaluated by the ACP. All labs, imaging and procedures were evaluated and performed by the ACP and I was available for consultation if any questions in the patients care came up.I have made amendments to the documentation where appropriate and otherwise agree with the history, physical exam, and plan as documented by the FRANK.

## 2025-05-15 NOTE — ED ADULT NURSE NOTE - NSFALLHARMRISKINTERV_ED_ALL_ED

## 2025-05-15 NOTE — ED ADULT NURSE NOTE - OBJECTIVE STATEMENT
63 y/o female presents to the ED complaining of a fall, pt states she was wearing crocs while walking the dog, when she tripped and fell hitting her chin on the pavement. pt denies LOC, visual changes, N/V, dizziness. lac noted on her chin.  pt has no other complaints at this time. pt is a/o x4 safety and comfort in place.

## 2025-05-15 NOTE — ED ADULT TRIAGE NOTE - CHIEF COMPLAINT QUOTE
pt laceration to chin s/p trip and fall. denies loc, - anti coagulation. neuro alert called at 1848 by md zimmerman. pt to ct.

## 2025-05-15 NOTE — ED ADULT NURSE NOTE - NS ED NOTE  TALK SOMEONE YN
[facility] Division of Hospital Medicine  Rosario Sahu MD  Pager (M-F, 8A-5P): [phone]  Other Times:  [phone]    Patient is a 60y old  Female who presents with a chief complaint of Chest pain (22 Dec 2021 11:36)      SUBJECTIVE / OVERNIGHT EVENTS:  no acute events overnight  feels well, no further cp/palpitations  does c/o cough worse since arriving from ED and feels congestion  no headache/fever    ADDITIONAL REVIEW OF SYSTEMS:    MEDICATIONS  (STANDING):  budesonide  80 MICROgram(s)/formoterol 4.5 MICROgram(s) Inhaler 2 Puff(s) Inhalation two times a day  guaiFENesin Oral Liquid (Sugar-Free) 100 milliGRAM(s) Oral once  heparin   Injectable 5000 Unit(s) SubCutaneous every 12 hours    MEDICATIONS  (PRN):  acetaminophen     Tablet .. 650 milliGRAM(s) Oral every 6 hours PRN Temp greater or equal to 38C (100.4F), Mild Pain (1 - 3)  melatonin 3 milliGRAM(s) Oral at bedtime PRN Insomnia      CAPILLARY BLOOD GLUCOSE        I&O's Summary    22 Dec 2021 07:01  -  22 Dec 2021 13:25  --------------------------------------------------------  IN: 480 mL / OUT: 0 mL / NET: 480 mL        PHYSICAL EXAM:  Vital Signs Last 24 Hrs  T(C): 36.5 (22 Dec 2021 11:39), Max: 36.8 (21 Dec 2021 22:16)  T(F): 97.7 (22 Dec 2021 11:39), Max: 98.2 (21 Dec 2021 22:16)  HR: 93 (22 Dec 2021 11:39) (76 - 93)  BP: 129/88 (22 Dec 2021 11:39) (115/74 - 129/88)  BP(mean): --  RR: 18 (22 Dec 2021 11:39) (18 - 20)  SpO2: 95% (22 Dec 2021 11:39) (95% - 99%)  CONSTITUTIONAL: NAD, well-developed, well-groomed  EYES: conjunctiva and sclera clear  ENMT: Moist oral mucosa  NECK: Supple  RESPIRATORY: Normal respiratory effort; lungs are clear to auscultation bilaterally; no wheeze/crackles  CARDIOVASCULAR: Regular rate and rhythm, normal S1 and S2  ABDOMEN: Nontender to palpation, normoactive bowel sounds, no rebound/guarding; No hepatosplenomegaly  MUSCULOSKELETAL:  Normal gait; no clubbing or cyanosis of digits; no joint swelling or tenderness to palpation  PSYCH: A+O to person, place, and time; affect appropriate    LABS:                        12.2   7.14  )-----------( 219      ( 22 Dec 2021 07:54 )             37.5     12-22    140  |  101  |  25<H>  ----------------------------<  73  3.6   |  25  |  1.19    Ca    9.4      22 Dec 2021 07:53  Mg     1.9     12-21    TPro  7.0  /  Alb  4.1  /  TBili  0.2  /  DBili  x   /  AST  28  /  ALT  32  /  AlkPhos  105  12-21    PT/INR - ( 20 Dec 2021 16:32 )   PT: 12.6 sec;   INR: 1.05 ratio         PTT - ( 20 Dec 2021 16:32 )  PTT:34.3 sec            RADIOLOGY & ADDITIONAL TESTS:  Results Reviewed:   Imaging Personally Reviewed:  < from: Nuclear Stress Test-Pharmacologic (Nuclear Stress Test-Pharmacologic .) (12.21.21 @ 13:43) >  IMPRESSIONS:Probably Normal Study  * HR/BP Response: Appropriate.  * Arrhythmia: Rare VPDs occurred during stress. Frequency  of VPDs increased with stress.  * Negative ECG evidence of ischemia after IV of  regadenoson.  * The left ventricle was small in size. There is a small,  mild defect in mid to distal inferolateral wall that is  partially reversible, consistent infarction with mild  janett-infarct ischemia. However, The observed defect(s)  partially correct(s) with prone imaging with no obvious  wall motion abnormalities suggestive of artifact.  * Post-stress gated wall motion analysis was performed  (LVEF > 70%;LVEDV = 39 ml.), revealing normal LV function.  *** No previous Nuclear/Stress exam.    < end of copied text >    Electrocardiogram Personally Reviewed:    COORDINATION OF CARE:  Care Discussed with Consultants/Other Providers [Y/N]: Cardiology Dr Sol  Prior or Outpatient Records Reviewed [Y/N]:  
[facility] Division of Hospital Medicine  Rosario Sahu MD  Pager (M-F, 8L-5P): [phone]  Other Times:  [phone]    Patient is a 60y old  Female who presents with a chief complaint of Chest pain (21 Dec 2021 01:20)      SUBJECTIVE / OVERNIGHT EVENTS:  no acute events overnight  feels well, no recurrent cp/sob/n/v  denies cough  has not received booster yet  primary vaccination was moderna    ADDITIONAL REVIEW OF SYSTEMS:    MEDICATIONS  (STANDING):  heparin   Injectable 5000 Unit(s) SubCutaneous every 12 hours    MEDICATIONS  (PRN):  acetaminophen     Tablet .. 650 milliGRAM(s) Oral every 6 hours PRN Temp greater or equal to 38C (100.4F), Mild Pain (1 - 3)  melatonin 3 milliGRAM(s) Oral at bedtime PRN Insomnia      CAPILLARY BLOOD GLUCOSE        I&O's Summary      PHYSICAL EXAM:  Vital Signs Last 24 Hrs  T(C): 36.4 (21 Dec 2021 15:45), Max: 36.8 (21 Dec 2021 00:08)  T(F): 97.5 (21 Dec 2021 15:45), Max: 98.2 (21 Dec 2021 00:08)  HR: 93 (21 Dec 2021 15:45) (80 - 93)  BP: 125/83 (21 Dec 2021 15:45) (102/61 - 125/83)  BP(mean): --  RR: 18 (21 Dec 2021 15:45) (18 - 18)  SpO2: 99% (21 Dec 2021 15:45) (96% - 99%)  CONSTITUTIONAL: NAD, well-developed, well-groomed  EYES: conjunctiva and sclera clear  ENMT: Moist oral mucosa  NECK: Supple  RESPIRATORY: Normal respiratory effort; lungs are clear to auscultation bilaterally  CARDIOVASCULAR: Regular rate and rhythm, normal S1 and S2  ABDOMEN: Nontender to palpation, normoactive bowel sounds, no rebound/guarding; No hepatosplenomegaly  MUSCULOSKELETAL:  Normal gait; no clubbing or cyanosis of digits; no joint swelling or tenderness to palpation  PSYCH: A+O to person, place, and time; affect appropriate    LABS:                        10.8   7.40  )-----------( 212      ( 21 Dec 2021 05:04 )             34.2     12-21    136  |  99  |  21  ----------------------------<  163<H>  4.2   |  23  |  1.22    Ca    9.4      21 Dec 2021 05:04  Mg     1.9     12-21    TPro  7.0  /  Alb  4.1  /  TBili  0.2  /  DBili  x   /  AST  28  /  ALT  32  /  AlkPhos  105  12-21    PT/INR - ( 20 Dec 2021 16:32 )   PT: 12.6 sec;   INR: 1.05 ratio         PTT - ( 20 Dec 2021 16:32 )  PTT:34.3 sec            RADIOLOGY & ADDITIONAL TESTS:  Results Reviewed:   Imaging Personally Reviewed:  < from: CT Angio Chest PE Protocol w/ IV Cont (12.20.21 @ 21:38) >  No evidence of pulmonary embolism.    Status post partial right lower lobe lung resection.  Nonspecific opacity   around the right lower lobe surgical material likely represents   postsurgical changes; however correlation with prior outside hospital   imaging would be required to exclude the possibility of recurrent tumor.    < end of copied text >    < from: TTE with Doppler (w/Cont) (12.21.21 @ 09:32) >  onclusions:  1. Normal mitral valve. Minimal mitral regurgitation.  2. Aortic valve notwell visualized; appears calcified. No  aortic valve regurgitation seen.  3. Endocardial visualization enhanced with intravenous  injection of Ultrasonic Enhancing Agent (Definity).  Hyperdynamic left ventricular systolic function.  4. The right ventricle is not well visualized; grossly  normal right ventricular size and systolic function.  *** No previous Echo exam.    < end of copied text >    Electrocardiogram Personally Reviewed:    COORDINATION OF CARE:  Care Discussed with Consultants/Other Providers [Y/N]:  Prior or Outpatient Records Reviewed [Y/N]:  
No

## 2025-05-15 NOTE — ED PROVIDER NOTE - NSFOLLOWUPINSTRUCTIONS_ED_ALL_ED_FT
Today in the emergency department you were evaluated for mandibular fracture.  Please follow-up in the oral surgery office within 1 week of discharge from the emergency department. You can call their office to make an appointment at [phone].    Please follow up with your primary care physician within 1-2 weeks of discharge from the emergency department.  Please bring a copy of your results with you.  Please return to the emergency department for worsening of your symptoms.    You may take Acetaminophen over the counter as needed for pain and/or fever. Use as directed and see medication warnings.  You may take Ibuprofen over the counter as needed for pain and/or fever. Use as directed and see medication warnings.

## 2025-05-15 NOTE — ED ADULT NURSE NOTE - NS ED NURSE IV DC DT
Referred by: David Beltran MD; Medical Diagnosis (from order):    Diagnosis Information      Diagnosis    V45.89, V15.51 (ICD-9-CM) - Z98.890, Z87.81 (ICD-10-CM) - S/P ORIF (open reduction internal fixation) fracture    V54.16 (ICD-9-CM) - S82.142D (ICD-10-CM) - Closed fracture of left tibial plateau with routine healing, subsequent encounter                Daily Treatment Note -  Physical Therapy    Visit:  2   Diagnosis Precautions: TTWB LLE  Patient alert and oriented X3.    SUBJECTIVE                                                                                                               Pt reports \"I feel I can put more weight down with my toe down, but someone told me I should be walking heel first. I put about 30% of weight through that foot\" (pt re-educated on current LLE TTWB order). Pt reports \"pain is good, I only take pain pills every 3 days; It is inconsistent, but usually always controlled\". Pt reports pain after a few days of consistently using the LLE a bit more. Pt asked \"Will my calf muscle ever grow back?\" and \"will I ever be able to play basketball again?\".    OBJECTIVE                                                                                                                     Range of Motion (ROM)   (degrees unless noted; active unless noted; norms in ( ); negative=lacking to 0, positive=beyond 0)   Knee:    - Flexion (150):        • Left: Passive: 80    - Flexion in prone:        • Left: Passive: -17      TREATMENT                                                                                                                  Therapeutic Exercise:  B quad sets 2 sets x2 mins-long sitting for visual feedback  Longsitting L PF with doubled up YTB 2 sets x12  Sidelying L hip abduction 2 sets x12  SLR 2 sets x1 min  Supine L heel slides on plexiglass 2 sets x2 mins  Seated L LAQ 2 sets x1 min    Increased time taken to re-educate patient on the MD's current order of LLE TTWB, as pt  reported .  Pt also educated in healing process and timeline, and that prior activities such as regaining L calf strength/hypertrophy or playing basketball can be achieved in due time with continued ROM and strength gains.     Time taken to create and administer HEP to pt. Pt given small square of Dycem.    Manual Therapy:  Dycem assisted LLE medial knee scar massage x5 minutes- greater mobility restrictions distally on the scar    Skilled input: verbal instruction/cues and tactile instruction/cues    Writer verbally educated and received verbal consent for hand placement, positioning of patient, and techniques to be performed today from patient for clothing adjustments for techniques, hand placement and palpation for techniques and therapist position for techniques as described above and how they are pertinent to the patient's plan of care.    Home Exercise Program/Education Materials: Access Code: [email or web address]  URL: [email or web address]  Date: 09/16/2021  Prepared by: Mylene Jarrell    Exercises  Long Sitting Ankle Plantar Flexion with Resistance - 2 x daily - 7 x weekly - 2 sets - 10 reps - no hold  Supine Active Straight Leg Raise - 2 x daily - 7 x weekly - 2 sets - 10 reps - no hold  Sidelying Hip Abduction - 2 x daily - 7 x weekly - 2 sets - 10 reps - no hold  Seated Long Arc Quad - 2 x daily - 7 x weekly - 2 sets - 10 reps - no hold  Supine Heel Slide - 2 x daily - 7 x weekly - 2 sets - 10 reps - no hold  Supine Knee Extension Stretch on Towel Roll - 2 x daily - 7 x weekly - 2 sets - 10 reps - 20-23 seconds hold  Supine Quad Set - 2 x daily - 7 x weekly - 2 sets - 10 reps - no hold       ASSESSMENT                                                                                                             Pt presents with no pain and ROM and strength deficits consistent with immobility and TTWB 10 weeks s/p L knee surgery. Pt continues to have restrictions in scar tissue mobility. Pt will  continue to benefit from skilled physical therapy services to progress LLE ROM, strength, and scar mobility.     Patient Education:   Results of above outlined education: Verbalizes understanding, Demonstrates understanding and Needs reinforcement    I was in the immediate presence of the student and directed the student’s performance of the services. I am responsible for all treatment, assessment, documentation, and billing rendered for this patient  Mylene Jones, PT          PLAN                                                                                                                           Suggestions for next session as indicated: Progress resistance and intensity, consider OTB for PF, hip flexion, abduction, and LAQ. Progress time in terminal knee extension stretch and heel slides for added mobility.          Therapy procedure time and total treatment time can be found documented on the Time Entry flowsheet   16-May-2025 00:42

## 2025-05-15 NOTE — ED ADULT NURSE NOTE - NS ED NOTE ABUSE RESPONSE YN
November 29, 2022     Patient: Sanam Sandhu   YOB: 1966   Date of Visit: 11/29/2022       To Whom it May Concern:    Sanam Sandhu was seen in my clinic on 11/29/2022 at 2:00 pm.    Please excuse Sanam for her absence from work on 11/29/2022- 12/29/2022.     She will follow up with me in office in about 4 weeks for re-evaluation.    Sincerely,       Jourdan Sewell, DO    Medical information is confidential and cannot be disclosed without the written consent of the patient or her representative.       Yes

## 2025-05-15 NOTE — ED STATDOCS - PHYSICAL EXAMINATION
General: Patient in no acute distress, AAOX3.   HENMT: NC, 1cm laceration under the chin, 1cm abrasion noted to mid lower lip, two 2cm superficial lacerations to inner mucosa of lip, no nasal congestion, MMM   Neck: supple  CVS: regular rate and rhythm, no murmur  Resp: Good air entry bilaterally, No wheeze/rhonchi.  Abd: Soft non tender, non distended, +bowel sounds, No guarding, rebound tenderness   Ext: FROM in all ext, 2+ pulses throughout  BACK: no midline tenderness, no stepoffs, no CVA ttp  NEURO: no focal deficit, gross motor and sensory intact throughout, gait stable. General: Patient in no acute distress, AAOX3.   HENMT: NC, 1cm laceration under the chin, 1cm abrasion noted to mid lower lip, two 3 mm superficial lacerations to inner mucosa of lip, no nasal congestion, MMM   Neck: supple, no midline ttp  CVS: regular rate and rhythm, no murmur  Resp: Good air entry bilaterally, No wheeze/rhonchi.  Abd: Soft non tender, non distended, +bowel sounds, No guarding, rebound tenderness   Ext: FROM in all ext, 2+ pulses throughout  BACK: no midline tenderness, no stepoffs, no CVA ttp  NEURO: no focal deficit, gross motor and sensory intact throughout, gait stable.

## 2025-05-15 NOTE — ED PROVIDER NOTE - PROGRESS NOTE DETAILS
Patient stable.  Does not want any pain meds at this time.  Evaluated by OMFS as a final recommendations, quest preop labs Mendy Wood PGY3 - sign out -64-year-old female presenting as a transfer from OSH for mandibular fracture. Patient without significant pain on reassessment. Dispo pending OMFS recommendations. Patient evaluated by OMFS.  States patient may go home with close follow-up within 1 week in their clinic.  No labs at this time.  Patient has not required pain meds airway is intact.  Last night patient denies any pain. Nina PGY3 - Patient evaluated by OMFS.  Patient to follow-up in outpatient office.  Dispo to home with OMFS follow-up.

## 2025-05-15 NOTE — CONSULT NOTE ADULT - ASSESSMENT
Assessment/Plan: 64F, no pmhx, fell and sustained b/l condylar fx (R side is medially displaced), sutured chin and lower lip lac.   - No Acute OMFS intervention at this time. Follow up in OMFS clinic in 1 week. Call [phone] to schedule an appointment.  - Rec Pain Control  - No pressure to face, ice to face  - Rec full liquid diet

## 2025-05-15 NOTE — ED ADULT NURSE NOTE - OBJECTIVE STATEMENT
64y Female. no significant medical hx. Surgical hx of appendectomy. A.Ox4, respirations even and unlabored and OOB self. Presents to ED after mechanical fall today. pt states "I was rushing when crossing the road and tripped." denies LOC. Cut and scant bleeding noted on dressing to chin. Able to speak in clear sentences. pt denies AC use. Pending surgery recommendations. Vitally stable. IM tetanus shot administered. Sarika, no neurological deficits present. Radial pulse 2+. Pt denies shortness of breath, chest pain, nausea/vomiting, urinary symptoms and numbness/ tingling. Bed in lowest position, side rails raised and call bell within reach.

## 2025-05-15 NOTE — ED PROVIDER NOTE - ATTENDING CONTRIBUTION TO CARE
Attending Statement: I have personally seen and examined this patient. I have fully participated in the care of this patient. I have reviewed all pertinent clinical information, including history physical exam, plan and the Resident's note and agree except as noted  see MDM

## 2025-05-15 NOTE — ED STATDOCS - CLINICAL SUMMARY MEDICAL DECISION MAKING FREE TEXT BOX
63 y/o F with PMHx of chronic cough, appendectomy presents to the ED c/o mechanical trip and fall at 4:30pm. Pt with R sided jaw pain. Called as NA. Will r/o intracranial pathology vs fracture. Plan to do imaging, laceration repair, re-eval.

## 2025-05-15 NOTE — ED PROVIDER NOTE - PROGRESS NOTE DETAILS
63 yo F with no reported PMHx presented to ED for mechanical fall 3 hours PTA. Pt states she fell face forward and landed on chin. Fall was witnessed by . Denies LOC. Denies ha, dizziness, nausea, vomiting, vision changes.   1cm abrasion to mid lower lip  Two 5mm shallow lacerations on lower inner lip    1 cm chin laceration    CT head, c spine, facial bones     -Nichol Gonzales PA-C 7:42 PM   Spoke to transfer team for OMFS consult.   Discussed BL mandibular condylar fx with resident Dr. Bobbi Valdez   Transfer to [facility] ED. Accepting Physician Dr. Cecilio Spain     Chin laceration and inner lip laceration repaired    Pt left  ED in stable condition

## 2025-05-15 NOTE — ED ADULT NURSE NOTE - NSFALLRISKINTERV_ED_ALL_ED

## 2025-05-15 NOTE — ED PROVIDER NOTE - CLINICAL SUMMARY MEDICAL DECISION MAKING FREE TEXT BOX
64-year-old female denies significant past medical history was transferred for evaluation by OMFS for bilateral mandibular condylar fracture, right side is medially displaced.  Patient states she was walking across the street brushing and fell forward hitting her chin on the car.  No LOC.  Not dizzy not lightheaded before or after the fall.  Denies headache denies neck pain denies chest pain denies abdominal pain.  Not on blood thinners.  Prior to transfer the repair the chin lack.  Has no loose teeth.  Vital signs noted. AO#3  Swelling and tenderness to the right compared to the left mandible.  No overlying redness. no epistaxis. lac to chin w bandage in place. no lose teeth. mild trismus.  No work of breathing.  Abdomen is soft. moving all ext.  plan offered and declined pain meds at this time.  Spoke with OMFS will evaluate patient.  Tetanus immunization.

## 2025-05-15 NOTE — ED ADULT TRIAGE NOTE - CHIEF COMPLAINT QUOTE
transfer from [facility] s/p mechanical trip and fall at ~430pm today, landing on chin. pt found to have  +mandibular fx, sutures noted to chin. no active bleeding noted. denies active pain on assessment. denies blood thinner use, LOC. denies h/a, dizziness, vision changes, fevers/chills, gu sx, cp, sob, abd pain, n/v, diarrhea, constipation. 20gIV noted to RAC. denies significant past medical hx.

## 2025-05-16 PROBLEM — R05.3 CHRONIC COUGH: Chronic | Status: ACTIVE | Noted: 2023-04-05

## 2025-05-24 ENCOUNTER — EMERGENCY (EMERGENCY)
Facility: HOSPITAL | Age: 65
LOS: 0 days | Discharge: HOME | End: 2025-05-24
Attending: EMERGENCY MEDICINE
Payer: MEDICARE

## 2025-05-24 VITALS
HEART RATE: 72 BPM | RESPIRATION RATE: 19 BRPM | TEMPERATURE: 98 F | DIASTOLIC BLOOD PRESSURE: 77 MMHG | OXYGEN SATURATION: 99 % | SYSTOLIC BLOOD PRESSURE: 125 MMHG

## 2025-05-24 VITALS — HEIGHT: 68 IN

## 2025-05-24 DIAGNOSIS — S01.81XD LACERATION WITHOUT FOREIGN BODY OF OTHER PART OF HEAD, SUBSEQUENT ENCOUNTER: ICD-10-CM

## 2025-05-24 DIAGNOSIS — Z98.890 OTHER SPECIFIED POSTPROCEDURAL STATES: Chronic | ICD-10-CM

## 2025-05-24 DIAGNOSIS — Z98.82 BREAST IMPLANT STATUS: Chronic | ICD-10-CM

## 2025-05-24 DIAGNOSIS — Z90.49 ACQUIRED ABSENCE OF OTHER SPECIFIED PARTS OF DIGESTIVE TRACT: Chronic | ICD-10-CM

## 2025-05-24 PROCEDURE — L9995: CPT

## 2025-05-24 PROCEDURE — 99212 OFFICE O/P EST SF 10 MIN: CPT

## 2025-06-03 ENCOUNTER — APPOINTMENT (OUTPATIENT)
Age: 65
End: 2025-06-03

## 2025-06-11 ENCOUNTER — APPOINTMENT (OUTPATIENT)
Dept: OPHTHALMOLOGY | Facility: CLINIC | Age: 65
End: 2025-06-11

## 2025-06-11 PROCEDURE — 99203 OFFICE O/P NEW LOW 30 MIN: CPT

## (undated) DEVICE — POSITIONER FOAM EGG CRATE ULNAR 2PCS (PINK)

## (undated) DEVICE — SLV COMPRESSION KNEE MED

## (undated) DEVICE — PREP BETADINE SPONGE STICKS

## (undated) DEVICE — SUT PLAIN GUT FAST ABSORBING 5-0 PC-1

## (undated) DEVICE — SUT VICRYL 2-0 27" SH UNDYED

## (undated) DEVICE — DRSG DERMABOND 0.7ML

## (undated) DEVICE — SUT MONOCRYL 3-0 18" PS-2 UNDYED

## (undated) DEVICE — NDL HYPO SAFE 22G X 1.5" (BLACK)

## (undated) DEVICE — ONETRAC LIGHTED RETRACTOR 135 X 30MM DISP

## (undated) DEVICE — VENODYNE/SCD SLEEVE CALF MEDIUM

## (undated) DEVICE — SUT NYLON 2-0 18" FS

## (undated) DEVICE — GLV 8 PROTEXIS (WHITE)

## (undated) DEVICE — PREP CHLORAPREP HI-LITE ORANGE 26ML

## (undated) DEVICE — DRAPE MAGNETIC INSTRUMENT MEDIUM

## (undated) DEVICE — ELCTR STRYKER NEPTUNE BLADE COATED, INSULATED 70MM

## (undated) DEVICE — DRAPE TOWEL BLUE 17" X 24"

## (undated) DEVICE — WARMING BLANKET FULL UNDERBODY

## (undated) DEVICE — PACK BREAST

## (undated) DEVICE — DRSG MASTISOL

## (undated) DEVICE — DRAPE TOP SHEET 53" X 101"

## (undated) DEVICE — DRSG STERISTRIPS 0.5 X 4"

## (undated) DEVICE — SYR ASEPTO

## (undated) DEVICE — MARKING PEN W RULER

## (undated) DEVICE — ELCTR PENCIL SMOKE EVACUATOR COATED PUSH BUTTON 70MM

## (undated) DEVICE — DRSG GAUZE MOISTURIZER 0.5 OZ 4X8